# Patient Record
Sex: FEMALE | HISPANIC OR LATINO
[De-identification: names, ages, dates, MRNs, and addresses within clinical notes are randomized per-mention and may not be internally consistent; named-entity substitution may affect disease eponyms.]

---

## 2023-09-06 ENCOUNTER — APPOINTMENT (OUTPATIENT)
Dept: DERMATOLOGY | Facility: CLINIC | Age: 43
End: 2023-09-06

## 2024-03-31 ENCOUNTER — NON-APPOINTMENT (OUTPATIENT)
Age: 44
End: 2024-03-31

## 2024-04-01 ENCOUNTER — APPOINTMENT (OUTPATIENT)
Dept: DERMATOLOGY | Facility: CLINIC | Age: 44
End: 2024-04-01
Payer: MEDICAID

## 2024-04-01 DIAGNOSIS — D48.5 NEOPLASM OF UNCERTAIN BEHAVIOR OF SKIN: ICD-10-CM

## 2024-04-01 DIAGNOSIS — L73.9 FOLLICULAR DISORDER, UNSPECIFIED: ICD-10-CM

## 2024-04-01 DIAGNOSIS — L30.9 DERMATITIS, UNSPECIFIED: ICD-10-CM

## 2024-04-01 PROBLEM — Z00.00 ENCOUNTER FOR PREVENTIVE HEALTH EXAMINATION: Status: ACTIVE | Noted: 2024-04-01

## 2024-04-01 PROCEDURE — 99204 OFFICE O/P NEW MOD 45 MIN: CPT

## 2024-04-01 RX ORDER — HYDROCORTISONE 25 MG/G
2.5 CREAM TOPICAL
Qty: 2 | Refills: 2 | Status: ACTIVE | COMMUNITY
Start: 2024-04-01 | End: 1900-01-01

## 2024-04-01 RX ORDER — CLINDAMYCIN PHOSPHATE 10 MG/ML
1 LOTION TOPICAL
Qty: 1 | Refills: 6 | Status: ACTIVE | COMMUNITY
Start: 2024-04-01 | End: 1900-01-01

## 2024-04-01 NOTE — HISTORY OF PRESENT ILLNESS
[FreeTextEntry1] : npa: rash in the groin [de-identified] : 44F presenting today as a new patient for evaluation of the below  1. Rash in the groin x years, itchy.  Using clotrimazole betamethasone cream daily x 2 years according to Patient, which helps alleviate pruritus.  No other treatments tried.  Occasionally uses baby wipes.

## 2024-04-01 NOTE — ASSESSMENT
[FreeTextEntry1] : 1. Dermatitis of uncertain etiology, perineum, chronic - Favor contact dermatitis 2/2 to (baby wipes, waxing), but unclear diagnosis.  - No involvement of the labia majora, labia minora, vaginal introitus on TODAY's exam.  - No signs of extramammary paget's, No accentuated skin lines suggestive of LSC.  - Will treat conservatively today and refer to Dr. Pearson.  - Stop baby wipes. Take a break from waxing/other hair removal practices.  - Stop clotrimazole-betamethasone diprop cream daily.  - Start HCT 2.5% cream BID to affected itchy areas for up to 2 weeks at a time, 1 week off. SED.  - Barrier creams discussed (bland emollient like Vaseline suggested).   2. Folliculitis, mons pubis, left inguinal fold - New diagnosis with uncertain prognosis.  - Patient taking a break from other hair removal practices as above.  - Start clindamycin lotion to affected bumps BID PRN.  - Will re-evaluate at f/u. Will not add BPO today given concern for aggravating pruritus.   3. Favor Onychomycosis, toenails - chronic, not at treatment goal - Diagnosis reviewed. - Treatment options discussed with patient including topical antifungal lacquers and oral antifungal pills including r/b/recurrence/SE profile/tx expectations as well as need for toenail clipping to confirm diagnosis. - nail clipping for PAS performed today - check CBC and CMP prior to starting lamisil PO, one month after starting lamisil and 3 months after start of medication\par - Ordered CBC and CMP this visit. - will call patient with results and initiate rx with terbinafine, SED, do not drink etoh while on this med - discussed that this treatment will prevent the new nail plate forming from being re-infected and the old nail plate will take a long time to grow out 12-18 months, dystrophy and color change may remain.   RTC 1 mo to f/u with Dr. Pearson. I helped the  team set up appointment to ensure f/u with Dr. Pearson.

## 2024-04-01 NOTE — PHYSICAL EXAM
[FreeTextEntry3] : Focused skin exam performed  The relevant portions of the exam were performed today  AAOx3, NAD, well-appearing / pleasant Focused examination within normal limits with the exception of: Few pink and hyperpigmented small follicularly based papules at the mons pubis and left medial thigh Subtle pink erythematous smooth macule at left perineum, labia major and minora clear, vaginal introitus clear

## 2024-04-05 ENCOUNTER — NON-APPOINTMENT (OUTPATIENT)
Age: 44
End: 2024-04-05

## 2024-04-17 ENCOUNTER — APPOINTMENT (OUTPATIENT)
Dept: OBGYN | Facility: HOSPITAL | Age: 44
End: 2024-04-17

## 2024-04-18 ENCOUNTER — NON-APPOINTMENT (OUTPATIENT)
Age: 44
End: 2024-04-18

## 2024-04-23 LAB — DERMATOLOGY BIOPSY: NORMAL

## 2024-04-29 ENCOUNTER — APPOINTMENT (OUTPATIENT)
Dept: DERMATOLOGY | Facility: CLINIC | Age: 44
End: 2024-04-29